# Patient Record
Sex: FEMALE | Race: WHITE | NOT HISPANIC OR LATINO | Employment: STUDENT | URBAN - METROPOLITAN AREA
[De-identification: names, ages, dates, MRNs, and addresses within clinical notes are randomized per-mention and may not be internally consistent; named-entity substitution may affect disease eponyms.]

---

## 2022-01-31 ENCOUNTER — OFFICE VISIT (OUTPATIENT)
Dept: FAMILY MEDICINE CLINIC | Facility: CLINIC | Age: 22
End: 2022-01-31
Payer: COMMERCIAL

## 2022-01-31 VITALS
RESPIRATION RATE: 12 BRPM | TEMPERATURE: 98.2 F | BODY MASS INDEX: 27.23 KG/M2 | WEIGHT: 148 LBS | HEIGHT: 62 IN | SYSTOLIC BLOOD PRESSURE: 112 MMHG | OXYGEN SATURATION: 97 % | DIASTOLIC BLOOD PRESSURE: 72 MMHG | HEART RATE: 72 BPM

## 2022-01-31 DIAGNOSIS — Z13.1 SCREENING FOR DIABETES MELLITUS: ICD-10-CM

## 2022-01-31 DIAGNOSIS — Z11.59 NEED FOR HEPATITIS C SCREENING TEST: ICD-10-CM

## 2022-01-31 DIAGNOSIS — Z00.00 PREVENTATIVE HEALTH CARE: ICD-10-CM

## 2022-01-31 DIAGNOSIS — R53.82 CHRONIC FATIGUE: ICD-10-CM

## 2022-01-31 DIAGNOSIS — Z13.29 SCREENING FOR THYROID DISORDER: ICD-10-CM

## 2022-01-31 DIAGNOSIS — Z76.89 ENCOUNTER TO ESTABLISH CARE: ICD-10-CM

## 2022-01-31 DIAGNOSIS — Z11.4 SCREENING FOR HIV (HUMAN IMMUNODEFICIENCY VIRUS): ICD-10-CM

## 2022-01-31 DIAGNOSIS — Z13.220 SCREENING FOR LIPID DISORDERS: ICD-10-CM

## 2022-01-31 DIAGNOSIS — Z13.0 SCREENING FOR DEFICIENCY ANEMIA: ICD-10-CM

## 2022-01-31 DIAGNOSIS — Z11.3 ROUTINE SCREENING FOR STI (SEXUALLY TRANSMITTED INFECTION): ICD-10-CM

## 2022-01-31 DIAGNOSIS — N92.1 MENORRHAGIA WITH IRREGULAR CYCLE: Primary | ICD-10-CM

## 2022-01-31 PROCEDURE — 1036F TOBACCO NON-USER: CPT | Performed by: NURSE PRACTITIONER

## 2022-01-31 PROCEDURE — 3008F BODY MASS INDEX DOCD: CPT | Performed by: NURSE PRACTITIONER

## 2022-01-31 PROCEDURE — 3725F SCREEN DEPRESSION PERFORMED: CPT | Performed by: NURSE PRACTITIONER

## 2022-01-31 PROCEDURE — 99204 OFFICE O/P NEW MOD 45 MIN: CPT | Performed by: NURSE PRACTITIONER

## 2022-01-31 RX ORDER — PAROXETINE HYDROCHLORIDE 20 MG/1
20 TABLET, FILM COATED ORAL DAILY
COMMUNITY
Start: 2019-09-30 | End: 2022-05-05

## 2022-01-31 RX ORDER — DROSPIRENONE AND ETHINYL ESTRADIOL 0.02-3(28)
KIT ORAL EVERY 24 HOURS
COMMUNITY
End: 2022-01-31 | Stop reason: SDUPTHER

## 2022-01-31 RX ORDER — DROSPIRENONE AND ETHINYL ESTRADIOL 0.02-3(28)
KIT ORAL
COMMUNITY
End: 2022-06-06 | Stop reason: SDUPTHER

## 2022-01-31 RX ORDER — CLINDAMYCIN PHOSPHATE 11.9 MG/ML
SOLUTION TOPICAL AS NEEDED
COMMUNITY
Start: 2022-01-17

## 2022-01-31 NOTE — PROGRESS NOTES
Assessment/Plan:    1  Menorrhagia with irregular cycle  Assessment & Plan:  Symptoms have improved since starting contraception  Stable, following up with GYN - in the process of finding another Gyn and she is instructed that she can obtain PAP screening here in our office  Orders:  -     TSH, 3rd generation    2  Chronic fatigue  Assessment & Plan:  Encourage sleep hygiene, discussed in detail  Lab evaluation as discussed  Orders:  -     TSH, 3rd generation    3  Encounter to establish care    4  Need for hepatitis C screening test  -     Hepatitis C Antibody ([facility]); Future    5  Screening for HIV (human immunodeficiency virus)  -     [facility]- Human Immunodeficiency Virus 1/2 Antigen / Antibody ( Fourth Generation) with Reflex Testing; Future    6  Preventative health care  -     CBC and differential; Future  -     Comprehensive metabolic panel; Future  -     TSH, 3rd generation; Future  -     Lipid panel; Future  -     Hepatitis C Antibody ([facility]); Future  -     [facility]- Human Immunodeficiency Virus 1/2 Antigen / Antibody ( Fourth Generation) with Reflex Testing; Future    7  Screening for deficiency anemia  -     CBC and differential; Future    8  Screening for diabetes mellitus  -     Comprehensive metabolic panel; Future    9  Screening for thyroid disorder  -     TSH, 3rd generation; Future    10  Screening for lipid disorders  -     Lipid panel; Future    11  Routine screening for STI (sexually transmitted infection)  -     Chlamydia/GC amplified DNA by PCR; Future    BMI Counseling: Body mass index is 27 07 kg/m²  The BMI is above normal  Nutrition recommendations include encouraging healthy choices of fruits and vegetables  Rationale for BMI follow-up plan is due to patient being overweight or obese  Depression Screening and Follow-up Plan: Patient was screened for depression during today's encounter   They screened negative with a PHQ-2 score of 0  Return for Annual physical     Subjective:      Patient ID: Michaela Rollins is a 24 y o  female  Chief Complaint   Patient presents with   Waylon Maloney is a 24year old female who presents to the office to establish care with a new primary care provider  Reports she is doing well overall  Reports history of irregular periods  States she would go 4-5 months in between periods  Onset of menses was in 6th grade per pt, then did not have another period until she was in 7-8th grade  Reports chronic fatigue but does report inconsistent sleep schedule, occassionally going to bed at 10pm or 1am  Denies fever, chills, shortness of breath, cough, n/v/d  The following portions of the patient's history were reviewed and updated as appropriate: allergies, current medications, past family history, past medical history, past social history, past surgical history and problem list     Review of Systems   Constitutional: Positive for fatigue  Negative for chills and fever  Respiratory: Negative for cough, chest tightness and shortness of breath  Cardiovascular: Negative for chest pain  Genitourinary: Negative for menstrual problem  Current Outpatient Medications   Medication Sig Dispense Refill    clindamycin (CLEOCIN T) 1 % external solution Apply topically as needed        drospirenone-ethinyl estradiol (Vestura) 3-0 02 MG per tablet       PARoxetine (Paxil) 20 mg tablet Take 20 mg by mouth daily         No current facility-administered medications for this visit  Objective:    /72 (BP Location: Right arm, Patient Position: Sitting, Cuff Size: Adult)   Pulse 72   Temp 98 2 °F (36 8 °C) (Temporal)   Resp 12   Ht 5' 2" (1 575 m)   Wt 67 1 kg (148 lb)   LMP 01/03/2022 (Exact Date)   SpO2 97%   BMI 27 07 kg/m²        Physical Exam  Vitals reviewed  Constitutional:       General: She is not in acute distress       Appearance: She is well-developed  She is not diaphoretic  HENT:      Head: Normocephalic and atraumatic  Eyes:      General:         Right eye: No discharge  Left eye: No discharge  Conjunctiva/sclera: Conjunctivae normal    Neck:      Thyroid: No thyromegaly  Cardiovascular:      Rate and Rhythm: Normal rate and regular rhythm  Heart sounds: Normal heart sounds  Pulmonary:      Effort: Pulmonary effort is normal  No respiratory distress  Breath sounds: Normal breath sounds  No decreased breath sounds, wheezing, rhonchi or rales  Musculoskeletal:      Cervical back: Normal range of motion and neck supple  Lymphadenopathy:      Cervical: No cervical adenopathy  Skin:     General: Skin is warm and dry  Findings: No rash  Neurological:      Mental Status: She is alert and oriented to person, place, and time  Psychiatric:         Behavior: Behavior normal          Thought Content:  Thought content normal          Judgment: Judgment normal                 BARTOLOME Burris

## 2022-01-31 NOTE — ASSESSMENT & PLAN NOTE
Symptoms have improved since starting contraception  Stable, following up with GYN - in the process of finding another Gyn and she is instructed that she can obtain PAP screening here in our office

## 2022-05-05 ENCOUNTER — OFFICE VISIT (OUTPATIENT)
Dept: FAMILY MEDICINE CLINIC | Facility: CLINIC | Age: 22
End: 2022-05-05
Payer: COMMERCIAL

## 2022-05-05 VITALS
OXYGEN SATURATION: 98 % | WEIGHT: 150 LBS | TEMPERATURE: 97.2 F | BODY MASS INDEX: 27.6 KG/M2 | DIASTOLIC BLOOD PRESSURE: 68 MMHG | RESPIRATION RATE: 12 BRPM | HEART RATE: 71 BPM | HEIGHT: 62 IN | SYSTOLIC BLOOD PRESSURE: 110 MMHG

## 2022-05-05 DIAGNOSIS — R53.82 CHRONIC FATIGUE: Primary | ICD-10-CM

## 2022-05-05 DIAGNOSIS — Z13.0 SCREENING FOR DEFICIENCY ANEMIA: ICD-10-CM

## 2022-05-05 DIAGNOSIS — Z13.1 SCREENING FOR DIABETES MELLITUS: ICD-10-CM

## 2022-05-05 DIAGNOSIS — Z00.00 PREVENTATIVE HEALTH CARE: ICD-10-CM

## 2022-05-05 DIAGNOSIS — F41.9 ANXIETY: ICD-10-CM

## 2022-05-05 DIAGNOSIS — Z13.29 SCREENING FOR THYROID DISORDER: ICD-10-CM

## 2022-05-05 DIAGNOSIS — Z13.220 SCREENING FOR LIPID DISORDERS: ICD-10-CM

## 2022-05-05 PROCEDURE — 3008F BODY MASS INDEX DOCD: CPT | Performed by: NURSE PRACTITIONER

## 2022-05-05 PROCEDURE — 36415 COLL VENOUS BLD VENIPUNCTURE: CPT | Performed by: NURSE PRACTITIONER

## 2022-05-05 PROCEDURE — 99214 OFFICE O/P EST MOD 30 MIN: CPT | Performed by: NURSE PRACTITIONER

## 2022-05-05 PROCEDURE — 1036F TOBACCO NON-USER: CPT | Performed by: NURSE PRACTITIONER

## 2022-05-05 RX ORDER — HYDROXYZINE HYDROCHLORIDE 10 MG/1
10 TABLET, FILM COATED ORAL EVERY 6 HOURS PRN
COMMUNITY

## 2022-05-05 RX ORDER — PAROXETINE HYDROCHLORIDE 40 MG/1
40 TABLET, FILM COATED ORAL DAILY
COMMUNITY
Start: 2022-02-03

## 2022-05-05 NOTE — PROGRESS NOTES
Assessment/Plan:    1  Chronic fatigue  -     CBC and differential  -     Comprehensive metabolic panel  -     Lipid panel  -     TSH, 3rd generation with Free T4 reflex  -     Lyme Antibody Profile with reflex to WB  -     TIBC  -     Ferritin    2  Anxiety  -     CBC and differential  -     Comprehensive metabolic panel  -     Lipid panel  -     TSH, 3rd generation with Free T4 reflex    3  Preventative health care  -     CBC and differential  -     Comprehensive metabolic panel  -     Lipid panel  -     TSH, 3rd generation with Free T4 reflex    4  Screening for deficiency anemia  -     CBC and differential    5  Screening for diabetes mellitus  -     Comprehensive metabolic panel    6  Screening for lipid disorders  -     Lipid panel    7  Screening for thyroid disorder  -     TSH, 3rd generation with Free T4 reflex            Return in about 2 weeks (around 5/19/2022) for Recheck  Subjective:      Patient ID: Aydee Marie is a 24 y o  female  Chief Complaint   Patient presents with    Fatigue     pt here for anxiety, fatigue       Benjamin Brown is a 24year old female who presents to the office for evaluation and management of fatigue  Reports symptoms have been chronic for her but worsened over the past one week  Reports that she has missed classes at college due to oversleeping which is outside of her normal  Reports falling asleep around 9pm and awakening at 10 am usually  States that she has been sleeping until 2-3 pm now  Denies fever, chills, chest pain, shortness of breath, n/v/d  Reports periodic anxiety, seeing psych  The following portions of the patient's history were reviewed and updated as appropriate: allergies, current medications, past family history, past medical history, past social history, past surgical history and problem list     Review of Systems   Constitutional: Positive for fatigue  Respiratory: Negative for cough, chest tightness and shortness of breath      Cardiovascular: Much of the documentation for this visit was completed in the [facility] system. Please see the attached documentation for further details about this patient's care.     Lynette Garcia RN       Negative for chest pain  Skin: Negative for rash  Psychiatric/Behavioral: The patient is nervous/anxious  Current Outpatient Medications   Medication Sig Dispense Refill    drospirenone-ethinyl estradiol (Vestura) 3-0 02 MG per tablet       hydrOXYzine HCL (ATARAX) 10 mg tablet Take 10 mg by mouth every 6 (six) hours as needed for itching      PARoxetine (PAXIL) 40 MG tablet Take 40 mg by mouth daily      clindamycin (CLEOCIN T) 1 % external solution Apply topically as needed         No current facility-administered medications for this visit  Objective:    /68   Pulse 71   Temp (!) 97 2 °F (36 2 °C) (Temporal)   Resp 12   Ht 5' 2" (1 575 m)   Wt 68 kg (150 lb)   LMP 04/25/2022 (Exact Date)   SpO2 98%   BMI 27 44 kg/m²        Physical Exam  Vitals reviewed  Constitutional:       Appearance: Normal appearance  HENT:      Head: Normocephalic and atraumatic  Cardiovascular:      Rate and Rhythm: Normal rate and regular rhythm  Pulses: Normal pulses  Heart sounds: Normal heart sounds  Pulmonary:      Effort: Pulmonary effort is normal       Breath sounds: Normal breath sounds  Neurological:      General: No focal deficit present  Mental Status: She is alert and oriented to person, place, and time  Motor: Motor function is intact  Deep Tendon Reflexes: Reflexes are normal and symmetric     Psychiatric:         Mood and Affect: Mood normal                 BARTOLOME Michelle

## 2022-05-06 LAB
ALBUMIN SERPL-MCNC: 4.5 G/DL (ref 3.9–5)
ALBUMIN/GLOB SERPL: 1.6 {RATIO} (ref 1.2–2.2)
ALP SERPL-CCNC: 63 IU/L (ref 44–121)
ALT SERPL-CCNC: 20 IU/L (ref 0–32)
AST SERPL-CCNC: 19 IU/L (ref 0–40)
B BURGDOR IGG+IGM SER QL IA: NEGATIVE
BASOPHILS # BLD AUTO: 0 X10E3/UL (ref 0–0.2)
BASOPHILS NFR BLD AUTO: 0 %
BILIRUB SERPL-MCNC: 0.3 MG/DL (ref 0–1.2)
BUN SERPL-MCNC: 13 MG/DL (ref 6–20)
BUN/CREAT SERPL: 14 (ref 9–23)
CALCIUM SERPL-MCNC: 10 MG/DL (ref 8.7–10.2)
CHLORIDE SERPL-SCNC: 101 MMOL/L (ref 96–106)
CHOLEST SERPL-MCNC: 267 MG/DL (ref 100–199)
CHOLEST/HDLC SERPL: 2.7 RATIO (ref 0–4.4)
CO2 SERPL-SCNC: 19 MMOL/L (ref 20–29)
CREAT SERPL-MCNC: 0.91 MG/DL (ref 0.57–1)
EGFR: 91 ML/MIN/1.73
EOSINOPHIL # BLD AUTO: 0.2 X10E3/UL (ref 0–0.4)
EOSINOPHIL NFR BLD AUTO: 2 %
ERYTHROCYTE [DISTWIDTH] IN BLOOD BY AUTOMATED COUNT: 13.1 % (ref 11.7–15.4)
FERRITIN SERPL-MCNC: 24 NG/ML (ref 15–150)
GLOBULIN SER-MCNC: 2.9 G/DL (ref 1.5–4.5)
GLUCOSE SERPL-MCNC: 90 MG/DL (ref 65–99)
HCT VFR BLD AUTO: 39.5 % (ref 34–46.6)
HDLC SERPL-MCNC: 100 MG/DL
HGB BLD-MCNC: 13.3 G/DL (ref 11.1–15.9)
IMM GRANULOCYTES # BLD: 0.1 X10E3/UL (ref 0–0.1)
IMM GRANULOCYTES NFR BLD: 1 %
IRON SATN MFR SERPL: 8 % (ref 15–55)
IRON SERPL-MCNC: 41 UG/DL (ref 27–159)
LDLC SERPL CALC-MCNC: 134 MG/DL (ref 0–99)
LYMPHOCYTES # BLD AUTO: 2.9 X10E3/UL (ref 0.7–3.1)
LYMPHOCYTES NFR BLD AUTO: 31 %
MCH RBC QN AUTO: 30.9 PG (ref 26.6–33)
MCHC RBC AUTO-ENTMCNC: 33.7 G/DL (ref 31.5–35.7)
MCV RBC AUTO: 92 FL (ref 79–97)
MONOCYTES # BLD AUTO: 0.5 X10E3/UL (ref 0.1–0.9)
MONOCYTES NFR BLD AUTO: 6 %
NEUTROPHILS # BLD AUTO: 5.7 X10E3/UL (ref 1.4–7)
NEUTROPHILS NFR BLD AUTO: 60 %
PLATELET # BLD AUTO: 307 X10E3/UL (ref 150–450)
POTASSIUM SERPL-SCNC: 4.3 MMOL/L (ref 3.5–5.2)
PROT SERPL-MCNC: 7.4 G/DL (ref 6–8.5)
RBC # BLD AUTO: 4.3 X10E6/UL (ref 3.77–5.28)
SL AMB VLDL CHOLESTEROL CALC: 33 MG/DL (ref 5–40)
SODIUM SERPL-SCNC: 136 MMOL/L (ref 134–144)
TIBC SERPL-MCNC: 490 UG/DL (ref 250–450)
TRIGL SERPL-MCNC: 193 MG/DL (ref 0–149)
TSH SERPL DL<=0.005 MIU/L-ACNC: 1.66 UIU/ML (ref 0.45–4.5)
UIBC SERPL-MCNC: 449 UG/DL (ref 131–425)
WBC # BLD AUTO: 9.3 X10E3/UL (ref 3.4–10.8)

## 2022-06-06 ENCOUNTER — OFFICE VISIT (OUTPATIENT)
Dept: FAMILY MEDICINE CLINIC | Facility: CLINIC | Age: 22
End: 2022-06-06
Payer: COMMERCIAL

## 2022-06-06 VITALS
RESPIRATION RATE: 12 BRPM | SYSTOLIC BLOOD PRESSURE: 100 MMHG | OXYGEN SATURATION: 96 % | DIASTOLIC BLOOD PRESSURE: 70 MMHG | HEIGHT: 62 IN | WEIGHT: 150 LBS | HEART RATE: 94 BPM | BODY MASS INDEX: 27.6 KG/M2 | TEMPERATURE: 97.4 F

## 2022-06-06 DIAGNOSIS — Z30.41 ENCOUNTER FOR SURVEILLANCE OF CONTRACEPTIVE PILLS: ICD-10-CM

## 2022-06-06 DIAGNOSIS — Z23 NEED FOR HPV VACCINATION: ICD-10-CM

## 2022-06-06 DIAGNOSIS — E61.1 IRON DEFICIENCY: ICD-10-CM

## 2022-06-06 DIAGNOSIS — J35.1 TONSILLAR ENLARGEMENT: ICD-10-CM

## 2022-06-06 DIAGNOSIS — Z00.00 ENCOUNTER FOR ANNUAL GENERAL MEDICAL EXAMINATION WITHOUT ABNORMAL FINDINGS IN ADULT: Primary | ICD-10-CM

## 2022-06-06 DIAGNOSIS — E78.2 MIXED HYPERLIPIDEMIA: ICD-10-CM

## 2022-06-06 DIAGNOSIS — R53.82 CHRONIC FATIGUE: ICD-10-CM

## 2022-06-06 PROCEDURE — 90471 IMMUNIZATION ADMIN: CPT

## 2022-06-06 PROCEDURE — 3008F BODY MASS INDEX DOCD: CPT | Performed by: NURSE PRACTITIONER

## 2022-06-06 PROCEDURE — 99395 PREV VISIT EST AGE 18-39: CPT | Performed by: NURSE PRACTITIONER

## 2022-06-06 PROCEDURE — 1036F TOBACCO NON-USER: CPT | Performed by: NURSE PRACTITIONER

## 2022-06-06 PROCEDURE — 90651 9VHPV VACCINE 2/3 DOSE IM: CPT

## 2022-06-06 RX ORDER — DROSPIRENONE AND ETHINYL ESTRADIOL 0.02-3(28)
1 KIT ORAL DAILY
Qty: 84 TABLET | Refills: 1 | Status: SHIPPED | OUTPATIENT
Start: 2022-06-06

## 2022-06-06 NOTE — PROGRESS NOTES
FAMILY PRACTICE HEALTH MAINTENANCE OFFICE VISIT  [facility] - [facility]    NAME: Trent Boyer  AGE: 24 y o  SEX: female  : 2000     DATE: 2022    Assessment and Plan     1  Encounter for annual general medical examination without abnormal findings in adult  Comments:  Age appropriate screenings and recommendations discussed  Fasting labs reviewed  2  Chronic fatigue  Assessment & Plan:  Symptoms slightly improved with iron supplementation  3  Mixed hyperlipidemia  -     Lipid panel; Future  -     Lipid panel    4  Need for HPV vaccination  -     HPV VACCINE 9 VALENT IM    5  Encounter for surveillance of contraceptive pills  -     drospirenone-ethinyl estradiol (MISSY) 3-0 02 MG per tablet; Take 1 tablet by mouth daily    6  Iron deficiency  Assessment & Plan:  Recommend increase in iron rich foods  Iron supplementation daily  · Patient Counseling:   · Nutrition: Stressed importance of a well balanced diet, moderation of sodium/saturated fat, caloric balance and sufficient intake of fiber  · Exercise: Stressed the importance of regular exercise with a goal of 150 minutes per week  · Dental Health: Discussed daily flossing and brushing and regular dental visits   · Sexuality: Discussed sexually transmitted infections, use of condoms and prevention of unintended pregnancy  · Alcohol Use:  Recommended moderation of alcohol intake  · Injury Prevention: Discussed Safety Belts, Safety Helmets, and Smoke Detectors    · Immunizations reviewed: See Orders and Risks and Benefits discussed  · Discussed benefits of:  Cervical Cancer screening and Screening labs   BMI Counseling: Body mass index is 27 44 kg/m²  Discussed with patient's BMI with her  The BMI is above normal  Exercise recommendations include exercising 3-5 times per week  Return in about 6 months (around 2022) for Radha Hair 4          Chief Complaint     Chief Complaint   Patient presents with    Annual Exam       History of Present Illness     HPI    Well Adult Physical   Patient here for a comprehensive physical exam       Diet and Physical Activity  Diet: well balanced diet  Exercise: intermittently      Depression Screen  PHQ-2/9 Depression Screening            General Health  Hearing: Normal:  bilateral  Vision: no vision problems  Dental: regular dental visits    Reproductive Health  No issues  and Regular Periods      The following portions of the patient's history were reviewed and updated as appropriate: allergies, current medications, past family history, past medical history, past social history, past surgical history and problem list     Review of Systems     Review of Systems   Constitutional: Negative for diaphoresis, fatigue and fever  HENT: Negative for ear pain and hearing loss  Eyes: Negative for pain and visual disturbance  Respiratory: Negative for chest tightness and shortness of breath  Cardiovascular: Negative for chest pain, palpitations and leg swelling  Gastrointestinal: Negative for abdominal pain, constipation and diarrhea  Genitourinary: Negative for difficulty urinating  Musculoskeletal: Negative for arthralgias and myalgias  Skin: Negative for rash  Neurological: Negative for dizziness, numbness and headaches  Psychiatric/Behavioral: Negative for sleep disturbance  Past Medical History     Past Medical History:   Diagnosis Date    Anxiety        Past Surgical History     History reviewed  No pertinent surgical history  Social History     Social History     Socioeconomic History    Marital status: Single     Spouse name: None    Number of children: None    Years of education: None    Highest education level: None   Occupational History    None   Tobacco Use    Smoking status: Never Smoker    Smokeless tobacco: Never Used   Vaping Use    Vaping Use: Never used   Substance and Sexual Activity    Alcohol use:  Yes Comment: occational    Drug use: Never    Sexual activity: None   Other Topics Concern    None   Social History Narrative    None     Social Determinants of Health     Financial Resource Strain: Not on file   Food Insecurity: Not on file   Transportation Needs: Not on file   Physical Activity: Not on file   Stress: Not on file   Social Connections: Not on file   Intimate Partner Violence: Not on file   Housing Stability: Not on file       Family History     Family History   Problem Relation Age of Onset    Anxiety disorder Mother     Anxiety disorder Maternal Grandmother     Mental illness Neg Hx     Substance Abuse Neg Hx        Current Medications       Current Outpatient Medications:     clindamycin (CLEOCIN T) 1 % external solution, Apply topically as needed  , Disp: , Rfl:     drospirenone-ethinyl estradiol (MISSY) 3-0 02 MG per tablet, Take 1 tablet by mouth daily, Disp: 84 tablet, Rfl: 1    hydrOXYzine HCL (ATARAX) 10 mg tablet, Take 10 mg by mouth every 6 (six) hours as needed for itching, Disp: , Rfl:     PARoxetine (PAXIL) 40 MG tablet, Take 40 mg by mouth daily, Disp: , Rfl:      Allergies     No Known Allergies    Objective     /70   Pulse 94   Temp (!) 97 4 °F (36 3 °C) (Temporal)   Resp 12   Ht 5' 2" (1 575 m)   Wt 68 kg (150 lb)   LMP 06/04/2022 (Exact Date)   SpO2 96%   BMI 27 44 kg/m²      Physical Exam  Vitals reviewed  Constitutional:       General: She is not in acute distress  Appearance: Normal appearance  She is well-developed  She is not diaphoretic  HENT:      Head: Normocephalic and atraumatic  Right Ear: Tympanic membrane, ear canal and external ear normal       Left Ear: Tympanic membrane, ear canal and external ear normal       Mouth/Throat:      Pharynx: Uvula midline  Tonsils: 3+ on the right  3+ on the left  Eyes:      General: Lids are normal       Extraocular Movements: Extraocular movements intact        Conjunctiva/sclera: Conjunctivae normal       Pupils: Pupils are equal, round, and reactive to light  Funduscopic exam:     Right eye: No hemorrhage or exudate  Red reflex present  Left eye: No hemorrhage or exudate  Red reflex present  Neck:      Thyroid: No thyroid mass or thyromegaly  Vascular: No carotid bruit  Cardiovascular:      Rate and Rhythm: Normal rate and regular rhythm  Pulses: Normal pulses  Heart sounds: Normal heart sounds, S1 normal and S2 normal  No murmur heard  Pulmonary:      Effort: Pulmonary effort is normal       Breath sounds: Normal breath sounds  No decreased breath sounds, wheezing, rhonchi or rales  Chest:   Breasts:      Right: No supraclavicular adenopathy  Left: No supraclavicular adenopathy  Abdominal:      General: Bowel sounds are normal  There is no distension  Palpations: Abdomen is soft  Tenderness: There is no abdominal tenderness  Musculoskeletal:         General: Normal range of motion  Cervical back: Full passive range of motion without pain, normal range of motion and neck supple  Right lower leg: No edema  Left lower leg: No edema  Lymphadenopathy:      Cervical: No cervical adenopathy  Upper Body:      Right upper body: No supraclavicular adenopathy  Left upper body: No supraclavicular adenopathy  Skin:     General: Skin is warm and dry  Findings: No rash  Neurological:      General: No focal deficit present  Mental Status: She is alert and oriented to person, place, and time  Cranial Nerves: No cranial nerve deficit  Sensory: No sensory deficit  Motor: Motor function is intact  Coordination: Coordination normal       Deep Tendon Reflexes: Reflexes are normal and symmetric  Psychiatric:         Speech: Speech normal          Behavior: Behavior normal          Thought Content:  Thought content normal          Judgment: Judgment normal                Kashif Quevedo, [address] [place] [address] [address] [facility]

## 2022-06-06 NOTE — Clinical Note
Please request immunization from pediatrician [facility]

## 2022-06-07 NOTE — PROGRESS NOTES
Received immunization  I inputted what I could, there were a few that I was not sure about  (I put a copy in your in box)    I mailed [facility] a medical records form to get all her records

## 2022-09-14 ENCOUNTER — TELEMEDICINE (OUTPATIENT)
Dept: FAMILY MEDICINE CLINIC | Facility: CLINIC | Age: 22
End: 2022-09-14
Payer: COMMERCIAL

## 2022-09-14 DIAGNOSIS — F43.21 ADJUSTMENT DISORDER WITH DEPRESSED MOOD: Primary | ICD-10-CM

## 2022-09-14 PROCEDURE — 99213 OFFICE O/P EST LOW 20 MIN: CPT | Performed by: NURSE PRACTITIONER

## 2022-09-14 RX ORDER — PAROXETINE HYDROCHLORIDE 40 MG/1
40 TABLET, FILM COATED ORAL DAILY
Qty: 90 TABLET | Refills: 0 | Status: SHIPPED | OUTPATIENT
Start: 2022-09-14

## 2022-09-14 NOTE — ASSESSMENT & PLAN NOTE
Pt needs her medications managed by PCP now that she has moved to a different location  Will manage her paxil 40 mg daily - per pt she is doing very well on this medication  Stable, no changes

## 2022-09-14 NOTE — PROGRESS NOTES
Virtual Regular Visit    Verification of patient location:    Patient is located in the following state in which I hold an active license [place]      Assessment/Plan:    Problem List Items Addressed This Visit        Other    Adjustment disorder with depressed mood - Primary     Pt needs her medications managed by PCP now that she has moved to a different location  Will manage her paxil 40 mg daily - per pt she is doing very well on this medication  Stable, no changes  Relevant Medications    PARoxetine (PAXIL) 40 MG tablet               Reason for visit is   Chief Complaint   Patient presents with    Virtual Regular Visit        Encounter provider BARTOLOME Encarnacion    Provider located at [address] [place] [address]  [id number]      Recent Visits  No visits were found meeting these conditions  Showing recent visits within past 7 days and meeting all other requirements  Today's Visits  Date Type Provider Dept   09/14/22 Telemedicine Stanley Mcduffie, [facility]   Showing today's visits and meeting all other requirements  Future Appointments  No visits were found meeting these conditions  Showing future appointments within next 150 days and meeting all other requirements       The patient was identified by name and date of birth  Medina Alvarez was informed that this is a telemedicine visit and that the visit is being conducted through [address] [place] [address] Now and patient was informed that this is a secure, HIPAA-compliant platform  She agrees to proceed     My office door was closed  No one else was in the room  She acknowledged consent and understanding of privacy and security of the video platform  The patient has agreed to participate and understands they can discontinue the visit at any time  Patient is aware this is a billable service       Veronika Sam is a 24 y o  female who is scheduled for a virtual visit to discuss medication management  Pt reports that her psych has instructed her that they can no longer manage her paxil due to her moving to [place]  Pt reprots she is stable on medications and denies any acute complaints at this time  Past Medical History:   Diagnosis Date    Anxiety        History reviewed  No pertinent surgical history  Current Outpatient Medications   Medication Sig Dispense Refill    PARoxetine (PAXIL) 40 MG tablet Take 1 tablet (40 mg total) by mouth daily 90 tablet 0    clindamycin (CLEOCIN T) 1 % external solution Apply topically as needed        drospirenone-ethinyl estradiol (MISSY) 3-0 02 MG per tablet Take 1 tablet by mouth daily 84 tablet 1    hydrOXYzine HCL (ATARAX) 10 mg tablet Take 10 mg by mouth every 6 (six) hours as needed for itching       No current facility-administered medications for this visit  No Known Allergies    Review of Systems   Constitutional: Negative for chills, fatigue and fever  Respiratory: Negative for cough, chest tightness and shortness of breath  Psychiatric/Behavioral: Negative for sleep disturbance  The patient is not nervous/anxious  Video Exam    There were no vitals filed for this visit  Physical Exam  Vitals reviewed  Constitutional:       Appearance: Normal appearance  HENT:      Head: Normocephalic and atraumatic  Neurological:      Mental Status: She is alert and oriented to person, place, and time     Psychiatric:         Mood and Affect: Mood normal           I spent 15 minutes directly with the patient during this visit

## 2022-12-03 DIAGNOSIS — Z30.41 ENCOUNTER FOR SURVEILLANCE OF CONTRACEPTIVE PILLS: ICD-10-CM

## 2022-12-14 DIAGNOSIS — F43.21 ADJUSTMENT DISORDER WITH DEPRESSED MOOD: ICD-10-CM

## 2022-12-14 RX ORDER — PAROXETINE HYDROCHLORIDE 40 MG/1
TABLET, FILM COATED ORAL
Qty: 90 TABLET | Refills: 0 | Status: SHIPPED | OUTPATIENT
Start: 2022-12-14

## 2023-03-08 ENCOUNTER — OFFICE VISIT (OUTPATIENT)
Dept: FAMILY MEDICINE CLINIC | Facility: CLINIC | Age: 23
End: 2023-03-08

## 2023-03-08 VITALS
BODY MASS INDEX: 29.26 KG/M2 | HEIGHT: 62 IN | TEMPERATURE: 98.3 F | OXYGEN SATURATION: 99 % | SYSTOLIC BLOOD PRESSURE: 102 MMHG | HEART RATE: 93 BPM | RESPIRATION RATE: 12 BRPM | WEIGHT: 159 LBS | DIASTOLIC BLOOD PRESSURE: 72 MMHG

## 2023-03-08 DIAGNOSIS — J03.91 RECURRENT TONSILLITIS: ICD-10-CM

## 2023-03-08 DIAGNOSIS — G47.19 EXCESSIVE DAYTIME SLEEPINESS: ICD-10-CM

## 2023-03-08 DIAGNOSIS — R53.82 CHRONIC FATIGUE: ICD-10-CM

## 2023-03-08 DIAGNOSIS — E61.1 IRON DEFICIENCY: Primary | ICD-10-CM

## 2023-03-08 RX ORDER — AZITHROMYCIN 250 MG/1
TABLET, FILM COATED ORAL
Qty: 6 TABLET | Refills: 0 | Status: SHIPPED | OUTPATIENT
Start: 2023-03-08 | End: 2023-03-12

## 2023-03-08 NOTE — PROGRESS NOTES
Assessment/Plan:    1  Iron deficiency  -     CBC and differential  -     Ferritin  -     TIBC  -     TSH, 3rd generation  -     T4, free  -     Sedimentation rate, automated    2  Chronic fatigue  -     CBC and differential  -     Ferritin  -     TIBC  -     TSH, 3rd generation  -     T4, free  -     Sedimentation rate, automated    3  Excessive daytime sleepiness  -     CBC and differential  -     Ferritin  -     TIBC  -     TSH, 3rd generation  -     T4, free  -     Sedimentation rate, automated    4  Recurrent tonsillitis  -     azithromycin (ZITHROMAX) 250 mg tablet; Take 2 tablets PO on day one, then take 1 tablet PO daily x's 4 days            Patient Instructions   Increase fluid intake as tolerated  Rest and humidification   Continue medications as directed   - antibiotic for full course  - pro-biotic to protect stomach while on medication   - Flonase OTC 1-2 sprays each nostril daily PRN post nasal drip   - Mucinex OTC to loosen secretions   Return to office in one week if symptoms persist or worsen        Return if symptoms worsen or fail to improve  Subjective:      Patient ID: Jhoana Rice is a 20 y o  female  Chief Complaint   Patient presents with   • Earache     Pt here for left ear pain, sleeping a lot, fatigue, upset stomach  Been going on for 2-3 years  Derrick Mitchell is a 20year old female who presents to the office for evaluation and management of chronic fatigue and also left sided ear pain  Pt reports that she has felt fatigued for years  States that she naps frequently  Pt reports that she did have a job where she was working overnight hours but she has not done that in some time and her symptoms of fatigue preceded this position  Pt reports that her mother expresses concerns over how tired she is  Denies fever, chills, chest pain  Reports her periods are regular and denies excessive bleeding or spotting in between cycles  Pt does have history of iron deficiency anemia   Reports that she also has left ear pain that began one week ago and continues to be painful  Denies sore throat  The following portions of the patient's history were reviewed and updated as appropriate: allergies, current medications, past family history, past medical history, past social history, past surgical history and problem list     Review of Systems   Constitutional: Positive for fatigue (chronic)  Negative for chills, diaphoresis and fever  HENT: Positive for ear pain  Negative for congestion, ear discharge, postnasal drip, rhinorrhea, sinus pressure, sinus pain and sore throat  Eyes: Negative for pain and discharge  Respiratory: Negative for cough, chest tightness, shortness of breath and wheezing  Cardiovascular: Negative for chest pain  Gastrointestinal: Negative for diarrhea, nausea and vomiting  Genitourinary: Negative for dysuria and vaginal bleeding  Musculoskeletal: Negative for myalgias  Skin: Negative for rash  Neurological: Negative for dizziness and headaches  Hematological: Negative for adenopathy  Does not bruise/bleed easily  Current Outpatient Medications   Medication Sig Dispense Refill   • azithromycin (ZITHROMAX) 250 mg tablet Take 2 tablets PO on day one, then take 1 tablet PO daily x's 4 days 6 tablet 0   • clindamycin (CLEOCIN T) 1 % external solution Apply topically as needed       • Ferrous Sulfate (IRON SUPPLEMENT PO) Take by mouth     • PARoxetine (PAXIL) 40 MG tablet TAKE 1 TABLET BY MOUTH EVERY DAY 90 tablet 0   • Red Yeast Rice Extract (RED YEAST RICE PO) Take by mouth     • Vestura 3-0 02 MG per tablet TAKE 1 TABLET BY MOUTH EVERY DAY 84 tablet 1   • hydrOXYzine HCL (ATARAX) 10 mg tablet Take 10 mg by mouth every 6 (six) hours as needed for itching (Patient not taking: Reported on 3/8/2023)       No current facility-administered medications for this visit         Objective:    /72 (BP Location: Left arm, Patient Position: Sitting, Cuff Size: Large) Pulse 93   Temp 98 3 °F (36 8 °C) (Temporal)   Resp 12   Ht 5' 2" (1 575 m)   Wt 72 1 kg (159 lb)   LMP 02/01/2023 (Approximate)   SpO2 99%   BMI 29 08 kg/m²        Physical Exam  Vitals reviewed  Constitutional:       General: She is not in acute distress  Appearance: Normal appearance  She is well-developed  She is not diaphoretic  HENT:      Head: Normocephalic and atraumatic  Right Ear: Ear canal and external ear normal  No drainage, swelling or tenderness  No middle ear effusion  Tympanic membrane is injected  Left Ear: Tympanic membrane, ear canal and external ear normal  No drainage, swelling or tenderness  No middle ear effusion  Nose: Rhinorrhea present  No mucosal edema (erythema)  Rhinorrhea is clear  Right Turbinates: Swollen  Left Turbinates: Swollen  Right Sinus: No maxillary sinus tenderness or frontal sinus tenderness  Left Sinus: No maxillary sinus tenderness or frontal sinus tenderness  Mouth/Throat:      Pharynx: Uvula midline  No oropharyngeal exudate or posterior oropharyngeal erythema  Tonsils: 2+ on the right  2+ on the left  Eyes:      General:         Right eye: No discharge  Left eye: No discharge  Conjunctiva/sclera: Conjunctivae normal    Neck:      Thyroid: No thyromegaly  Cardiovascular:      Rate and Rhythm: Normal rate and regular rhythm  Heart sounds: Normal heart sounds  Pulmonary:      Effort: Pulmonary effort is normal  No respiratory distress  Breath sounds: Normal breath sounds  No decreased breath sounds, wheezing, rhonchi or rales  Musculoskeletal:      Cervical back: Normal range of motion and neck supple  Lymphadenopathy:      Cervical: No cervical adenopathy  Skin:     General: Skin is warm and dry  Findings: No rash  Neurological:      Mental Status: She is alert and oriented to person, place, and time     Psychiatric:         Behavior: Behavior normal          Thought Content:  Thought content normal                 BARTOLOME Snyder

## 2023-03-09 LAB
BASOPHILS # BLD AUTO: 0 X10E3/UL (ref 0–0.2)
BASOPHILS NFR BLD AUTO: 1 %
EOSINOPHIL # BLD AUTO: 0.1 X10E3/UL (ref 0–0.4)
EOSINOPHIL NFR BLD AUTO: 2 %
ERYTHROCYTE [DISTWIDTH] IN BLOOD BY AUTOMATED COUNT: 12.5 % (ref 11.7–15.4)
ERYTHROCYTE [SEDIMENTATION RATE] IN BLOOD BY WESTERGREN METHOD: 14 MM/HR (ref 0–32)
FERRITIN SERPL-MCNC: 33 NG/ML (ref 15–150)
HCT VFR BLD AUTO: 37.2 % (ref 34–46.6)
HGB BLD-MCNC: 12.8 G/DL (ref 11.1–15.9)
IMM GRANULOCYTES # BLD: 0 X10E3/UL (ref 0–0.1)
IMM GRANULOCYTES NFR BLD: 1 %
IRON SATN MFR SERPL: 23 % (ref 15–55)
IRON SERPL-MCNC: 106 UG/DL (ref 27–159)
LYMPHOCYTES # BLD AUTO: 3.4 X10E3/UL (ref 0.7–3.1)
LYMPHOCYTES NFR BLD AUTO: 41 %
MCH RBC QN AUTO: 30.8 PG (ref 26.6–33)
MCHC RBC AUTO-ENTMCNC: 34.4 G/DL (ref 31.5–35.7)
MCV RBC AUTO: 90 FL (ref 79–97)
MONOCYTES # BLD AUTO: 0.5 X10E3/UL (ref 0.1–0.9)
MONOCYTES NFR BLD AUTO: 7 %
NEUTROPHILS # BLD AUTO: 4.2 X10E3/UL (ref 1.4–7)
NEUTROPHILS NFR BLD AUTO: 48 %
PLATELET # BLD AUTO: 264 X10E3/UL (ref 150–450)
RBC # BLD AUTO: 4.15 X10E6/UL (ref 3.77–5.28)
T4 FREE SERPL-MCNC: 0.92 NG/DL (ref 0.82–1.77)
TIBC SERPL-MCNC: 466 UG/DL (ref 250–450)
TSH SERPL DL<=0.005 MIU/L-ACNC: 1.91 UIU/ML (ref 0.45–4.5)
UIBC SERPL-MCNC: 360 UG/DL (ref 131–425)
WBC # BLD AUTO: 8.3 X10E3/UL (ref 3.4–10.8)

## 2023-03-25 DIAGNOSIS — F43.21 ADJUSTMENT DISORDER WITH DEPRESSED MOOD: ICD-10-CM

## 2023-03-27 RX ORDER — PAROXETINE HYDROCHLORIDE 40 MG/1
TABLET, FILM COATED ORAL
Qty: 90 TABLET | Refills: 0 | Status: SHIPPED | OUTPATIENT
Start: 2023-03-27

## 2023-05-30 ENCOUNTER — TELEMEDICINE (OUTPATIENT)
Dept: FAMILY MEDICINE CLINIC | Facility: CLINIC | Age: 23
End: 2023-05-30

## 2023-05-30 VITALS — BODY MASS INDEX: 29.26 KG/M2 | WEIGHT: 159 LBS | HEIGHT: 62 IN

## 2023-05-30 DIAGNOSIS — F43.21 ADJUSTMENT DISORDER WITH DEPRESSED MOOD: Primary | ICD-10-CM

## 2023-05-30 DIAGNOSIS — N92.1 MENORRHAGIA WITH IRREGULAR CYCLE: ICD-10-CM

## 2023-05-30 RX ORDER — PAROXETINE HYDROCHLORIDE 20 MG/1
20 TABLET, FILM COATED ORAL DAILY
Qty: 10 TABLET | Refills: 0 | Status: SHIPPED | OUTPATIENT
Start: 2023-05-30 | End: 2023-06-09

## 2023-05-30 RX ORDER — PAROXETINE 30 MG/1
30 TABLET, FILM COATED ORAL EVERY MORNING
Qty: 10 TABLET | Refills: 0 | Status: SHIPPED | OUTPATIENT
Start: 2023-05-30 | End: 2023-06-09

## 2023-05-30 RX ORDER — RISPERIDONE 2 MG/1
2 TABLET ORAL 2 TIMES DAILY
COMMUNITY
Start: 2023-04-25 | End: 2023-05-30

## 2023-05-30 RX ORDER — PAROXETINE 10 MG/1
10 TABLET, FILM COATED ORAL DAILY
Qty: 90 TABLET | Refills: 0 | Status: SHIPPED | OUTPATIENT
Start: 2023-05-30

## 2023-05-30 NOTE — PROGRESS NOTES
Virtual Regular Visit    Verification of patient location:    Patient is located at Home in the following state in which I hold an active license [place]      Assessment/Plan:    Problem List Items Addressed This Visit        Other    Menorrhagia with irregular cycle     Pt DC'd contraception on her own  Discussed safe sex practices and pregnancy prevention  Adjustment disorder with depressed mood - Primary     Pt doing well overall and is interested in stopping her paxil  Discussed tapering schedule  Recheck in 4 weeks  Relevant Medications    PARoxetine (PAXIL) 30 mg tablet    PARoxetine (PAXIL) 20 mg tablet    PARoxetine (PAXIL) 10 mg tablet            Reason for visit is   Chief Complaint   Patient presents with   • Medication Management     Depression screening = 2    • Virtual Regular Visit        Encounter provider BARTOLOME Oconnor    Provider located at [address] [place] [id number]      Recent Visits  Date Type Provider Dept   05/30/23 [facility]   Showing recent visits within past 7 days and meeting all other requirements  Future Appointments  No visits were found meeting these conditions  Showing future appointments within next 150 days and meeting all other requirements       The patient was identified by name and date of birth  Melva Kitchen was informed that this is a telemedicine visit and that the visit is being conducted through the [address] [facility] Now platform  She agrees to proceed     My office door was closed  No one else was in the room  She acknowledged consent and understanding of privacy and security of the video platform  The patient has agreed to participate and understands they can discontinue the visit at any time  Patient is aware this is a billable service       Sunshine Alonzo is a 20 y o  female with history of depression, who is "scheduled for a virtual visit and med check  Pt reports that she has stopped her birth control and she is not currently sexually active  Pt reports that she does not want to take anything additional if not necessary  States that she is also interested in stopping her paxil due to her feeling improved from prevous  Past Medical History:   Diagnosis Date   • Anxiety        History reviewed  No pertinent surgical history  Current Outpatient Medications   Medication Sig Dispense Refill   • clindamycin (CLEOCIN T) 1 % external solution Apply topically as needed       • Ferrous Sulfate (IRON SUPPLEMENT PO) Take by mouth     • PARoxetine (PAXIL) 10 mg tablet Take 1 tablet (10 mg total) by mouth daily 90 tablet 0   • PARoxetine (PAXIL) 20 mg tablet Take 1 tablet (20 mg total) by mouth daily for 10 days 10 tablet 0   • PARoxetine (PAXIL) 30 mg tablet Take 1 tablet (30 mg total) by mouth every morning for 10 days 10 tablet 0   • Red Yeast Rice Extract (RED YEAST RICE PO) Take by mouth       No current facility-administered medications for this visit  No Known Allergies    Review of Systems   Constitutional: Negative for chills, fatigue and fever  Respiratory: Negative for cough, chest tightness and shortness of breath  Cardiovascular: Negative for chest pain  Psychiatric/Behavioral: Negative for self-injury, sleep disturbance and suicidal ideas  The patient is not nervous/anxious  Video Exam    Vitals:    05/30/23 1546   Weight: 72 1 kg (159 lb)   Height: 5' 2\" (1 575 m)       Physical Exam  Vitals reviewed  Constitutional:       Appearance: Normal appearance  HENT:      Head: Normocephalic and atraumatic  Neurological:      Mental Status: She is alert and oriented to person, place, and time     Psychiatric:         Mood and Affect: Mood normal           Visit Time  Total Visit Duration: 20 minutes      "

## 2023-05-31 NOTE — ASSESSMENT & PLAN NOTE
Pt doing well overall and is interested in stopping her paxil  Discussed tapering schedule  Recheck in 4 weeks

## 2023-05-31 NOTE — ASSESSMENT & PLAN NOTE
Pt [facility] 28 contraception on her own  Discussed safe sex practices and pregnancy prevention

## 2023-07-21 ENCOUNTER — TELEMEDICINE (OUTPATIENT)
Dept: FAMILY MEDICINE CLINIC | Facility: CLINIC | Age: 23
End: 2023-07-21
Payer: COMMERCIAL

## 2023-07-21 DIAGNOSIS — F43.21 ADJUSTMENT DISORDER WITH DEPRESSED MOOD: Primary | ICD-10-CM

## 2023-07-21 PROCEDURE — 99213 OFFICE O/P EST LOW 20 MIN: CPT | Performed by: NURSE PRACTITIONER

## 2023-07-21 RX ORDER — DESVENLAFAXINE SUCCINATE 50 MG/1
50 TABLET, EXTENDED RELEASE ORAL DAILY
Qty: 30 TABLET | Refills: 0 | Status: SHIPPED | OUTPATIENT
Start: 2023-07-21 | End: 2024-01-17

## 2023-07-21 NOTE — ASSESSMENT & PLAN NOTE
Pt has previously tried paxil 40 mg and weaned off of the medication as she felt she was doing ok. Advise that we trial alternative agent and monitor for ongoing improvement. Encourage counseling and pt reports that she does have a list of practitioners with which she can schedule. Recheck 2 weeks.

## 2023-07-21 NOTE — Clinical Note
Please schedule 2 week recheck and request that she send over the testing that she completed for which medications would work best for her.

## 2023-07-21 NOTE — PROGRESS NOTES
Virtual Regular Visit    Verification of patient location:    Patient is located at Home in the following state in which I hold an active license [place]      Assessment/Plan:    Problem List Items Addressed This Visit        Other    Adjustment disorder with depressed mood - Primary     Pt has previously tried paxil 40 mg and weaned off of the medication as she felt she was doing ok. Advise that we trial alternative agent and monitor for ongoing improvement. Encourage counseling and pt reports that she does have a list of practitioners with which she can schedule. Recheck 2 weeks. Relevant Medications    desvenlafaxine succinate (PRISTIQ) 50 mg 24 hr tablet            Reason for visit is   Chief Complaint   Patient presents with   • Virtual Regular Visit        Encounter provider BARTOLOME Daugherty    Provider located at [address] [phone]      Recent Visits  No visits were found meeting these conditions. Showing recent visits within past 7 days and meeting all other requirements  Today's Visits  Date Type Provider Dept   07/21/23 Telemedicine Marley Martel [address] [place] [address] 190 Physicians   Showing today's visits and meeting all other requirements  Future Appointments  No visits were found meeting these conditions. Showing future appointments within next 150 days and meeting all other requirements       The patient was identified by name and date of birth. Kaveh Jacobson was informed that this is a telemedicine visit and that the visit is being conducted through the [facility] [address] Now platform. She agrees to proceed. .  My office door was closed. No one else was in the room. She acknowledged consent and understanding of privacy and security of the video platform. The patient has agreed to participate and understands they can discontinue the visit at any time. Patient is aware this is a billable service. Juliane Fenton is a 20 y.o. female with depression and anxiety who is scheduled for a virtual visit to discuss weaning off of medication. Reports that she stopped paxil 40 mg daily using at taper previously discussed at last visit. Reports that since stopping paxil, she has been having depressed mood and anxiety. Reports periodic anger outbursts. States that she has started paxil 10 mg daily again. Past Medical History:   Diagnosis Date   • Anxiety        History reviewed. No pertinent surgical history. Current Outpatient Medications   Medication Sig Dispense Refill   • desvenlafaxine succinate (PRISTIQ) 50 mg 24 hr tablet Take 1 tablet (50 mg total) by mouth daily 30 tablet 0   • clindamycin (CLEOCIN T) 1 % external solution Apply topically as needed       • Ferrous Sulfate (IRON SUPPLEMENT PO) Take by mouth     • Red Yeast Rice Extract (RED YEAST RICE PO) Take by mouth       No current facility-administered medications for this visit. No Known Allergies    Review of Systems   Psychiatric/Behavioral: Positive for agitation and dysphoric mood. Negative for behavioral problems. The patient is nervous/anxious. Video Exam    There were no vitals filed for this visit. Physical Exam  Vitals reviewed. Constitutional:       Appearance: Normal appearance. HENT:      Head: Normocephalic and atraumatic. Neurological:      Mental Status: She is alert and oriented to person, place, and time.    Psychiatric:         Mood and Affect: Mood normal.          Visit Time  Total Visit Duration: 20 minutes

## 2023-08-04 ENCOUNTER — TELEMEDICINE (OUTPATIENT)
Dept: FAMILY MEDICINE CLINIC | Facility: CLINIC | Age: 23
End: 2023-08-04
Payer: COMMERCIAL

## 2023-08-04 VITALS — BODY MASS INDEX: 29.08 KG/M2 | HEIGHT: 62 IN

## 2023-08-04 DIAGNOSIS — Z13.220 SCREENING FOR LIPID DISORDERS: ICD-10-CM

## 2023-08-04 DIAGNOSIS — N92.1 MENORRHAGIA WITH IRREGULAR CYCLE: ICD-10-CM

## 2023-08-04 DIAGNOSIS — F43.21 ADJUSTMENT DISORDER WITH DEPRESSED MOOD: Primary | ICD-10-CM

## 2023-08-04 DIAGNOSIS — Z11.59 NEED FOR HEPATITIS C SCREENING TEST: ICD-10-CM

## 2023-08-04 DIAGNOSIS — L70.0 ACNE VULGARIS: ICD-10-CM

## 2023-08-04 DIAGNOSIS — Z30.41 ENCOUNTER FOR SURVEILLANCE OF CONTRACEPTIVE PILLS: ICD-10-CM

## 2023-08-04 DIAGNOSIS — Z11.4 SCREENING FOR HIV (HUMAN IMMUNODEFICIENCY VIRUS): ICD-10-CM

## 2023-08-04 DIAGNOSIS — Z11.3 SCREENING FOR STDS (SEXUALLY TRANSMITTED DISEASES): ICD-10-CM

## 2023-08-04 DIAGNOSIS — Z13.1 SCREENING FOR DIABETES MELLITUS: ICD-10-CM

## 2023-08-04 PROCEDURE — 99214 OFFICE O/P EST MOD 30 MIN: CPT | Performed by: NURSE PRACTITIONER

## 2023-08-04 RX ORDER — DROSPIRENONE AND ETHINYL ESTRADIOL 0.03MG-3MG
1 KIT ORAL DAILY
Qty: 84 TABLET | Refills: 3 | Status: SHIPPED | OUTPATIENT
Start: 2023-08-04

## 2023-08-04 NOTE — ASSESSMENT & PLAN NOTE
Pt reports that she is tolerating pristiq 50 mg without issues. Stable. Encourage self care and healthy coping. Recheck 6 weeks, consider dose increase at that time pending pt symptoms.

## 2023-08-04 NOTE — PROGRESS NOTES
Virtual Regular Visit    Verification of patient location:    Patient is located at Home in the following state in which I hold an active license [place]      Assessment/Plan:    Problem List Items Addressed This Visit        Other    Menorrhagia with irregular cycle    Relevant Medications    drospirenone-ethinyl estradiol ([employer]) 3-0.03 MG per tablet    Adjustment disorder with depressed mood - Primary     Pt reports that she is tolerating pristiq 50 mg without issues. Stable. Encourage self care and healthy coping. Recheck 6 weeks, consider dose increase at that time pending pt symptoms. Other Visit Diagnoses     Encounter for surveillance of contraceptive pills        Relevant Medications    drospirenone-ethinyl estradiol (JOSE) 3-0.03 MG per tablet    Acne vulgaris        Relevant Medications    drospirenone-ethinyl estradiol ([employer]) 3-0.03 MG per tablet    Screening for lipid disorders        Relevant Orders    Lipid panel    Screening for diabetes mellitus        Relevant Orders    Comprehensive metabolic panel    Need for hepatitis C screening test        Relevant Orders    Hepatitis C Antibody    Screening for HIV (human immunodeficiency virus)        Relevant Orders    HIV 1/2 Antigen/Antibody (Fourth Generation) with Reflex Testing ([facility], [facility], or EXTERNAL LAB)    Screening for STDs (sexually transmitted diseases)        Relevant Orders    Chlamydia/GC amplified DNA by PCR               Reason for visit is   Chief Complaint   Patient presents with   • Depression   • Virtual Regular Visit        Encounter provider Thad Mcdaniel, [facility]    Provider located at [address] [phone]      Recent Visits  No visits were found meeting these conditions.   Showing recent visits within past 7 days and meeting all other requirements  Today's Visits  Date Type Provider Dept   08/04/23 [address] [facility]   Showing today's visits and meeting all other requirements  Future Appointments  No visits were found meeting these conditions. Showing future appointments within next 150 days and meeting all other requirements       The patient was identified by name and date of birth. Willie Hayden was informed that this is a telemedicine visit and that the visit is being conducted through the [email or web address] [facility] [employer] platform. She agrees to proceed. .  My office door was closed. No one else was in the room. She acknowledged consent and understanding of privacy and security of the video platform. The patient has agreed to participate and understands they can discontinue the visit at any time. Patient is aware this is a billable service. Rosina Felty is a 20 y.o. female who is scheduled for a virtual visit for a med check. Pt was started on pristiq 2 weeks ago after she had DCd paxil and birth control. Stated that she did feel symptoms beginning to return so new medication was started. Currently, pt reports that she is tolerated the new medication without issues. States that she has had an "uneventful week" so she does not notice any change or exacerbation of symptoms. Past Medical History:   Diagnosis Date   • Anxiety        History reviewed. No pertinent surgical history. Current Outpatient Medications   Medication Sig Dispense Refill   • desvenlafaxine succinate (PRISTIQ) 50 mg 24 hr tablet Take 1 tablet (50 mg total) by mouth daily 30 tablet 0   • drospirenone-ethinyl estradiol (JOSE) 3-0.03 MG per tablet Take 1 tablet by mouth daily 84 tablet 3   • Ferrous Sulfate (IRON SUPPLEMENT PO) Take by mouth     • Red Yeast Rice Extract (RED YEAST RICE PO) Take by mouth       No current facility-administered medications for this visit. No Known Allergies    Review of Systems   Constitutional: Negative for chills, fatigue and fever. Gastrointestinal: Negative for nausea.    Skin: Worsening acne, since DC birth control   Neurological: Negative for dizziness and headaches. Psychiatric/Behavioral: The patient is not nervous/anxious. Video Exam    Vitals:    08/04/23 0848   Height: 5' 2" (1.575 m)       Physical Exam  Vitals reviewed. Constitutional:       Appearance: Normal appearance. HENT:      Head: Normocephalic and atraumatic. Neurological:      Mental Status: She is alert and oriented to person, place, and time.    Psychiatric:         Mood and Affect: Mood normal.          Visit Time  Total Visit Duration: 20 minutes

## 2023-08-14 DIAGNOSIS — F43.21 ADJUSTMENT DISORDER WITH DEPRESSED MOOD: ICD-10-CM

## 2023-08-14 RX ORDER — DESVENLAFAXINE SUCCINATE 50 MG/1
50 TABLET, EXTENDED RELEASE ORAL DAILY
Qty: 90 TABLET | Refills: 1 | Status: SHIPPED | OUTPATIENT
Start: 2023-08-14

## 2023-10-18 LAB
ALBUMIN SERPL-MCNC: 4.6 G/DL (ref 4–5)
ALBUMIN/GLOB SERPL: 1.7 {RATIO} (ref 1.2–2.2)
ALP SERPL-CCNC: 83 IU/L (ref 44–121)
ALT SERPL-CCNC: 17 IU/L (ref 0–32)
AST SERPL-CCNC: 23 IU/L (ref 0–40)
BILIRUB SERPL-MCNC: 0.3 MG/DL (ref 0–1.2)
BUN SERPL-MCNC: 15 MG/DL (ref 6–20)
BUN/CREAT SERPL: 16 (ref 9–23)
CALCIUM SERPL-MCNC: 9.6 MG/DL (ref 8.7–10.2)
CHLORIDE SERPL-SCNC: 104 MMOL/L (ref 96–106)
CHOLEST SERPL-MCNC: 244 MG/DL (ref 100–199)
CHOLEST/HDLC SERPL: 3.2 RATIO (ref 0–4.4)
CO2 SERPL-SCNC: 18 MMOL/L (ref 20–29)
CREAT SERPL-MCNC: 0.96 MG/DL (ref 0.57–1)
EGFR: 85 ML/MIN/1.73
GLOBULIN SER-MCNC: 2.7 G/DL (ref 1.5–4.5)
GLUCOSE SERPL-MCNC: 89 MG/DL (ref 70–99)
HCV AB S/CO SERPL IA: NON REACTIVE
HDLC SERPL-MCNC: 76 MG/DL
HIV 1+2 AB+HIV1 P24 AG SERPL QL IA: NON REACTIVE
LDLC SERPL CALC-MCNC: 144 MG/DL (ref 0–99)
POTASSIUM SERPL-SCNC: 4.4 MMOL/L (ref 3.5–5.2)
PROT SERPL-MCNC: 7.3 G/DL (ref 6–8.5)
SL AMB VLDL CHOLESTEROL CALC: 24 MG/DL (ref 5–40)
SODIUM SERPL-SCNC: 136 MMOL/L (ref 134–144)
TRIGL SERPL-MCNC: 138 MG/DL (ref 0–149)

## 2023-10-19 ENCOUNTER — TELEMEDICINE (OUTPATIENT)
Dept: FAMILY MEDICINE CLINIC | Facility: CLINIC | Age: 23
End: 2023-10-19
Payer: COMMERCIAL

## 2023-10-19 DIAGNOSIS — Z82.49 FAMILY HISTORY OF HEART ATTACK: ICD-10-CM

## 2023-10-19 DIAGNOSIS — F43.21 ADJUSTMENT DISORDER WITH DEPRESSED MOOD: ICD-10-CM

## 2023-10-19 DIAGNOSIS — E78.49 FAMILIAL HYPERLIPIDEMIA: Primary | ICD-10-CM

## 2023-10-19 PROCEDURE — 99213 OFFICE O/P EST LOW 20 MIN: CPT | Performed by: NURSE PRACTITIONER

## 2023-10-23 PROBLEM — Z82.49 FAMILY HISTORY OF HEART ATTACK: Status: ACTIVE | Noted: 2023-10-23

## 2023-10-23 PROBLEM — E78.49 FAMILIAL HYPERLIPIDEMIA: Status: ACTIVE | Noted: 2023-10-23

## 2023-10-23 NOTE — ASSESSMENT & PLAN NOTE
Recommend heart healthy/mediteranean style eating. Discussed nutrition and exercise in detail. Handout provided.

## 2023-10-23 NOTE — PROGRESS NOTES
Virtual Regular Visit    Verification of patient location:    Patient is located at Home in the following state in which I hold an active license [place]      Assessment/Plan:    Problem List Items Addressed This Visit          Other    Adjustment disorder with depressed mood     Pt is taking pristiq 50 mg daily and tolerating without issues. Reports periodic anger outbursts when she is home with her parents from the [place] r/t anxiety. Discussed dose increase and pt would like to wait at this time. Familial hyperlipidemia - Primary     Recommend heart healthy/mediteranean style eating. Discussed nutrition and exercise in detail. Handout provided. Family history of heart attack            Reason for visit is   Chief Complaint   Patient presents with    Virtual Regular Visit          Encounter provider BARTOLOME Xie    Provider located at [address] [place] [phone]      Recent Visits  Date Type Provider Dept   10/19/23 [address] [place] [address] Physicians   Showing recent visits within past 7 days and meeting all other requirements  Future Appointments  No visits were found meeting these conditions. Showing future appointments within next 150 days and meeting all other requirements       The patient was identified by name and date of birth. Rickie  was informed that this is a telemedicine visit and that the visit is being conducted through the [address] [place] [address] [employer] platform. She agrees to proceed. .  My office door was closed. No one else was in the room. She acknowledged consent and understanding of privacy and security of the video platform. The patient has agreed to participate and understands they can discontinue the visit at any time. Patient is aware this is a billable service.      Maurilio Diggs is a 20 y.o. female who is scheduled for a virtual visit to discuss recent labsNo new acute complaints at this time. Past Medical History:   Diagnosis Date    Anxiety        History reviewed. No pertinent surgical history. Current Outpatient Medications   Medication Sig Dispense Refill    desvenlafaxine succinate (PRISTIQ) 50 mg 24 hr tablet TAKE 1 TABLET BY MOUTH EVERY DAY 90 tablet 1    drospirenone-ethinyl estradiol (JOSE) 3-0.03 MG per tablet Take 1 tablet by mouth daily 84 tablet 3    Ferrous Sulfate (IRON SUPPLEMENT PO) Take by mouth      Red Yeast Rice Extract (RED YEAST RICE PO) Take by mouth       No current facility-administered medications for this visit. No Known Allergies    Review of Systems   Constitutional:  Negative for chills, fatigue and fever. Respiratory:  Negative for shortness of breath. Cardiovascular:  Negative for chest pain. Video Exam    There were no vitals filed for this visit. Physical Exam  Vitals reviewed. Constitutional:       Appearance: Normal appearance. HENT:      Head: Normocephalic and atraumatic. Neurological:      Mental Status: She is alert and oriented to person, place, and time.    Psychiatric:         Mood and Affect: Mood normal.          Visit Time  Total Visit Duration: 20 minutes

## 2023-10-23 NOTE — ASSESSMENT & PLAN NOTE
Pt is taking pristiq 50 mg daily and tolerating without issues. Reports periodic anger outbursts when she is home with her parents from the [place] [facility]. Discussed dose increase and pt would like to wait at this time.

## 2023-11-13 ENCOUNTER — TELEPHONE (OUTPATIENT)
Age: 23
End: 2023-11-13

## 2023-11-13 NOTE — TELEPHONE ENCOUNTER
Left message on machine for Angelina with Sophie's detailed response. Please schedule nurse visit for 3 rd ángela per Stephen Peraza when [address] calls back.

## 2023-11-13 NOTE — TELEPHONE ENCOUNTER
Patient calling to schedule her 2nd [employer]. She stated she received her first on from her Pediatrician in 2018. She would now like her 2nd. She is requesting an appointment for tomorrow 11/14    Please review patients chart and call to schedule nurse visit.

## 2023-11-13 NOTE — TELEPHONE ENCOUNTER
She is due for her 3rd -we have record that she received a second one in 2022.  Please schedule her for her 3rd and final.

## 2023-11-14 ENCOUNTER — CLINICAL SUPPORT (OUTPATIENT)
Dept: FAMILY MEDICINE CLINIC | Facility: CLINIC | Age: 23
End: 2023-11-14
Payer: COMMERCIAL

## 2023-11-14 DIAGNOSIS — Z23 NEED FOR HPV VACCINATION: Primary | ICD-10-CM

## 2023-11-14 PROCEDURE — 90651 9VHPV VACCINE 2/3 DOSE IM: CPT

## 2023-11-14 PROCEDURE — 90471 IMMUNIZATION ADMIN: CPT

## 2024-02-12 DIAGNOSIS — F43.21 ADJUSTMENT DISORDER WITH DEPRESSED MOOD: ICD-10-CM

## 2024-02-12 RX ORDER — DESVENLAFAXINE SUCCINATE 50 MG/1
50 TABLET, EXTENDED RELEASE ORAL DAILY
Qty: 30 TABLET | Refills: 0 | Status: SHIPPED | OUTPATIENT
Start: 2024-02-12 | End: 2024-02-21

## 2024-02-21 DIAGNOSIS — F43.21 ADJUSTMENT DISORDER WITH DEPRESSED MOOD: ICD-10-CM

## 2024-02-21 RX ORDER — DESVENLAFAXINE SUCCINATE 50 MG/1
50 TABLET, EXTENDED RELEASE ORAL DAILY
Qty: 90 TABLET | Refills: 1 | Status: SHIPPED | OUTPATIENT
Start: 2024-02-21

## 2024-08-22 DIAGNOSIS — Z30.41 ENCOUNTER FOR SURVEILLANCE OF CONTRACEPTIVE PILLS: ICD-10-CM

## 2024-08-22 DIAGNOSIS — L70.0 ACNE VULGARIS: ICD-10-CM

## 2024-08-22 DIAGNOSIS — N92.1 MENORRHAGIA WITH IRREGULAR CYCLE: ICD-10-CM

## 2024-08-23 RX ORDER — DROSPIRENONE AND ETHINYL ESTRADIOL 0.03MG-3MG
1 KIT ORAL DAILY
Qty: 28 TABLET | Refills: 1 | Status: SHIPPED | OUTPATIENT
Start: 2024-08-23

## 2024-09-13 DIAGNOSIS — N92.1 MENORRHAGIA WITH IRREGULAR CYCLE: ICD-10-CM

## 2024-09-13 DIAGNOSIS — L70.0 ACNE VULGARIS: ICD-10-CM

## 2024-09-13 DIAGNOSIS — Z30.41 ENCOUNTER FOR SURVEILLANCE OF CONTRACEPTIVE PILLS: ICD-10-CM

## 2024-09-16 RX ORDER — DROSPIRENONE AND ETHINYL ESTRADIOL 0.03MG-3MG
1 KIT ORAL DAILY
Qty: 84 TABLET | Refills: 1 | Status: SHIPPED | OUTPATIENT
Start: 2024-09-16

## 2025-03-11 ENCOUNTER — TELEPHONE (OUTPATIENT)
Dept: FAMILY MEDICINE CLINIC | Facility: CLINIC | Age: 25
End: 2025-03-11

## 2025-03-11 NOTE — TELEPHONE ENCOUNTER
We received a refill request for drospirenone-ethinyl estradiol (JOSE).  However you have not been seen in our office since March of 2023.       Sophie is no longer in our network, she left this last December.  Dr. Baer has been with us for over a year and half, if you would like to schedule with her.       Please call [phone] to schedule an appt.  Once an appt is scheduled, the provider may give you enough medication to get you to that appt.  You will reach the access center when you call, they can assist you in scheduling an appt.

## 2025-03-19 NOTE — TELEPHONE ENCOUNTER
Spoke to Angelina.  She is now living in [place], [place] and is seeing a provider there.    Please remove Sophie as her PCP.    Thank you.

## 2025-03-29 NOTE — TELEPHONE ENCOUNTER
03/28/25 11:32 PM        The office's request has been received, reviewed, and the patient chart updated. The PCP has successfully been removed with a patient attribution note. This message will now be completed.        Thank you  Uet Marquez